# Patient Record
Sex: MALE | Race: WHITE | NOT HISPANIC OR LATINO | Employment: FULL TIME | URBAN - METROPOLITAN AREA
[De-identification: names, ages, dates, MRNs, and addresses within clinical notes are randomized per-mention and may not be internally consistent; named-entity substitution may affect disease eponyms.]

---

## 2017-05-03 ENCOUNTER — OFFICE VISIT (OUTPATIENT)
Dept: INTERNAL MEDICINE | Facility: CLINIC | Age: 27
End: 2017-05-03
Attending: INTERNAL MEDICINE
Payer: COMMERCIAL

## 2017-05-03 ENCOUNTER — LAB VISIT (OUTPATIENT)
Dept: LAB | Facility: OTHER | Age: 27
End: 2017-05-03
Attending: INTERNAL MEDICINE
Payer: COMMERCIAL

## 2017-05-03 VITALS
HEIGHT: 68 IN | HEART RATE: 87 BPM | SYSTOLIC BLOOD PRESSURE: 120 MMHG | WEIGHT: 176.81 LBS | OXYGEN SATURATION: 97 % | DIASTOLIC BLOOD PRESSURE: 88 MMHG | BODY MASS INDEX: 26.8 KG/M2

## 2017-05-03 DIAGNOSIS — R19.8 ALTERNATING CONSTIPATION AND DIARRHEA: ICD-10-CM

## 2017-05-03 DIAGNOSIS — R53.83 FATIGUE, UNSPECIFIED TYPE: ICD-10-CM

## 2017-05-03 DIAGNOSIS — M79.661 RIGHT CALF PAIN: ICD-10-CM

## 2017-05-03 DIAGNOSIS — Z00.00 ANNUAL PHYSICAL EXAM: Primary | ICD-10-CM

## 2017-05-03 DIAGNOSIS — N45.1 EPIDIDYMITIS: ICD-10-CM

## 2017-05-03 DIAGNOSIS — R06.83 SNORES: ICD-10-CM

## 2017-05-03 DIAGNOSIS — Z00.00 ANNUAL PHYSICAL EXAM: ICD-10-CM

## 2017-05-03 LAB
ALBUMIN SERPL BCP-MCNC: 4.3 G/DL
ALP SERPL-CCNC: 73 U/L
ALT SERPL W/O P-5'-P-CCNC: 60 U/L
ANION GAP SERPL CALC-SCNC: 9 MMOL/L
AST SERPL-CCNC: 22 U/L
BASOPHILS # BLD AUTO: 0.04 K/UL
BASOPHILS NFR BLD: 0.6 %
BILIRUB SERPL-MCNC: 1.1 MG/DL
BILIRUB UR QL STRIP: NEGATIVE
BUN SERPL-MCNC: 14 MG/DL
CALCIUM SERPL-MCNC: 9.4 MG/DL
CHLORIDE SERPL-SCNC: 104 MMOL/L
CHOLEST/HDLC SERPL: 4.3 {RATIO}
CLARITY UR: CLEAR
CO2 SERPL-SCNC: 27 MMOL/L
COLOR UR: YELLOW
CREAT SERPL-MCNC: 1 MG/DL
DIFFERENTIAL METHOD: ABNORMAL
EOSINOPHIL # BLD AUTO: 0.2 K/UL
EOSINOPHIL NFR BLD: 2.6 %
ERYTHROCYTE [DISTWIDTH] IN BLOOD BY AUTOMATED COUNT: 12.2 %
EST. GFR  (AFRICAN AMERICAN): >60 ML/MIN/1.73 M^2
EST. GFR  (NON AFRICAN AMERICAN): >60 ML/MIN/1.73 M^2
GLUCOSE SERPL-MCNC: 90 MG/DL
GLUCOSE UR QL STRIP: NEGATIVE
HCT VFR BLD AUTO: 47.5 %
HDL/CHOLESTEROL RATIO: 23.2 %
HDLC SERPL-MCNC: 190 MG/DL
HDLC SERPL-MCNC: 44 MG/DL
HGB BLD-MCNC: 16.9 G/DL
HGB UR QL STRIP: NEGATIVE
IGA SERPL-MCNC: 316 MG/DL
KETONES UR QL STRIP: NEGATIVE
LDLC SERPL CALC-MCNC: 96.2 MG/DL
LEUKOCYTE ESTERASE UR QL STRIP: NEGATIVE
LYMPHOCYTES # BLD AUTO: 2.2 K/UL
LYMPHOCYTES NFR BLD: 31.4 %
MCH RBC QN AUTO: 31.8 PG
MCHC RBC AUTO-ENTMCNC: 35.6 %
MCV RBC AUTO: 90 FL
MONOCYTES # BLD AUTO: 0.6 K/UL
MONOCYTES NFR BLD: 9.1 %
NEUTROPHILS # BLD AUTO: 3.9 K/UL
NEUTROPHILS NFR BLD: 55.4 %
NITRITE UR QL STRIP: NEGATIVE
NONHDLC SERPL-MCNC: 146 MG/DL
PH UR STRIP: 6 [PH] (ref 5–8)
PLATELET # BLD AUTO: 246 K/UL
PMV BLD AUTO: 10.2 FL
POTASSIUM SERPL-SCNC: 4 MMOL/L
PROT SERPL-MCNC: 7.6 G/DL
PROT UR QL STRIP: NEGATIVE
RBC # BLD AUTO: 5.31 M/UL
SODIUM SERPL-SCNC: 140 MMOL/L
SP GR UR STRIP: >=1.03 (ref 1–1.03)
TRIGL SERPL-MCNC: 249 MG/DL
URN SPEC COLLECT METH UR: ABNORMAL
UROBILINOGEN UR STRIP-ACNC: NEGATIVE EU/DL
WBC # BLD AUTO: 6.95 K/UL

## 2017-05-03 PROCEDURE — 85025 COMPLETE CBC W/AUTO DIFF WBC: CPT

## 2017-05-03 PROCEDURE — 1160F RVW MEDS BY RX/DR IN RCRD: CPT | Mod: S$GLB,,, | Performed by: INTERNAL MEDICINE

## 2017-05-03 PROCEDURE — 82784 ASSAY IGA/IGD/IGG/IGM EACH: CPT

## 2017-05-03 PROCEDURE — 81003 URINALYSIS AUTO W/O SCOPE: CPT

## 2017-05-03 PROCEDURE — 83516 IMMUNOASSAY NONANTIBODY: CPT

## 2017-05-03 PROCEDURE — 36415 COLL VENOUS BLD VENIPUNCTURE: CPT

## 2017-05-03 PROCEDURE — 80061 LIPID PANEL: CPT

## 2017-05-03 PROCEDURE — 99385 PREV VISIT NEW AGE 18-39: CPT | Mod: S$GLB,,, | Performed by: INTERNAL MEDICINE

## 2017-05-03 PROCEDURE — 80053 COMPREHEN METABOLIC PANEL: CPT

## 2017-05-03 PROCEDURE — 99999 PR PBB SHADOW E&M-NEW PATIENT-LVL IV: CPT | Mod: PBBFAC,,, | Performed by: INTERNAL MEDICINE

## 2017-05-03 PROCEDURE — 87591 N.GONORRHOEAE DNA AMP PROB: CPT

## 2017-05-03 PROCEDURE — 83036 HEMOGLOBIN GLYCOSYLATED A1C: CPT

## 2017-05-03 RX ORDER — DOXYCYCLINE HYCLATE 50 MG/1
50 CAPSULE ORAL
COMMUNITY
End: 2017-09-27

## 2017-05-03 NOTE — PROGRESS NOTES
Subjective:       Patient ID: Desean Santos is a 26 y.o. male.    Chief Complaint: Establish Care    HPI Comments: Here to establish care    Diagnosed with epididymitis and Oct and treated with amoxicillin with resolution of symptoms. Went to urgent care 4/26/17 for right sided testicular pain and urinary frequency. Started on doxycycline and has 3 days left. 2 days ago he noted return of right sided testicular pain along with pain with ejaculation. He is not taking any for symptoms aside from Abx. He denies dysuria, penile discharge, urinary frequency or urgency, hematuria.      For the past 3 years he has suffered from general abdominal discomfort, alternating diarrhea and constipation, mild cramping and bloating, and heartburn symptoms once a week. He denies any obvious triggers, BRBPR, melena, n/v.      Tingling of right hand of pinky and ring finger. Improves with wearing CTS brace but he does not wear it consistently.     He has been told he snores excessively. Fatigued all the time. Denies witnessed apneic episodes. Bad sleep schedule. Naps frequently during the day. He denies frequent nasal congestion or allergies.       Review of Systems   Constitutional: Positive for fatigue. Negative for appetite change, chills, fever and unexpected weight change.   HENT: Negative for hearing loss, sore throat and trouble swallowing.    Eyes: Negative for visual disturbance.   Respiratory: Negative for cough, chest tightness and shortness of breath.    Cardiovascular: Negative for chest pain and leg swelling.   Gastrointestinal: Negative for abdominal pain, blood in stool, constipation, diarrhea, nausea and vomiting.   Endocrine: Negative for polydipsia and polyuria.   Genitourinary: Negative for decreased urine volume, difficulty urinating, dysuria, frequency and urgency.   Musculoskeletal: Positive for arthralgias. Negative for gait problem.   Skin: Negative for rash.   Neurological: Positive for numbness. Negative for  "dizziness.   Psychiatric/Behavioral: The patient is not nervous/anxious.        Past Medical History:   Diagnosis Date    Allergy     Asthma      History reviewed. No pertinent surgical history.  History reviewed. No pertinent family history.  Social History     Social History    Marital status: Single     Spouse name: N/A    Number of children: N/A    Years of education: N/A     Occupational History    Not on file.     Social History Main Topics    Smoking status: Never Smoker    Smokeless tobacco: Not on file    Alcohol use Yes    Drug use: No    Sexual activity: Yes     Other Topics Concern    Not on file     Social History Narrative    No narrative on file         Objective:      Vitals:    05/03/17 1432   BP: 120/88   Pulse: 87   SpO2: 97%   Weight: 80.2 kg (176 lb 12.9 oz)   Height: 5' 8" (1.727 m)      Physical Exam   Constitutional: He is oriented to person, place, and time. He appears well-developed and well-nourished. No distress.   HENT:   Head: Normocephalic and atraumatic.   Mouth/Throat: Oropharynx is clear and moist. No oropharyngeal exudate.   Eyes: Conjunctivae and EOM are normal. Pupils are equal, round, and reactive to light. No scleral icterus.   Neck: No thyromegaly present.   Cardiovascular: Normal rate, regular rhythm and normal heart sounds.    No murmur heard.  Pulmonary/Chest: Effort normal and breath sounds normal. He has no wheezes. He has no rales.   Abdominal: Soft. He exhibits no distension. There is no tenderness.   Musculoskeletal: He exhibits no edema or tenderness.   Lymphadenopathy:     He has no cervical adenopathy.   Neurological: He is alert and oriented to person, place, and time.   Skin: Skin is warm and dry.   Psychiatric: He has a normal mood and affect. His behavior is normal.       Assessment:       1. Annual physical exam    2. Alternating constipation and diarrhea    3. Epididymitis    4. Snores    5. Fatigue, unspecified type    6. Right calf pain      "   Plan:       Desean was seen today for establish care.    Diagnoses and all orders for this visit:    Annual physical exam  -     Hemoglobin A1c; Future  -     CBC auto differential; Future  -     Comprehensive metabolic panel; Future  -     Lipid panel; Future    Alternating constipation and diarrhea  -trial of dairy free, probiotics, FODMAP diet  -     Tissue transglutaminase, IgA; Future  -     IgA; Future    Epididymitis  -finish doxycycline, NSAIDS, scrotal support. Contact office if pain worsenes or persist despite conservative measures. Will switch to Levaquin at that time.  -     URINALYSIS  -     C. trachomatis/N. gonorrhoeae by AMP DNA Urine    Snores  -     Ambulatory consult for Sleep Study    Fatigue, unspecified type  -     Ambulatory consult for Sleep Study    Right calf pain  -     Ambulatory Referral to Physical/Occupational Therapy       Health Maintenance and Vaccinations:  Tdap up to date: yes. 2010     Notification of Lab Results: [facility]  Side effects of medication(s) were discussed in detail and patient voiced understanding.  Patient will call back for any issues or complications.

## 2017-05-03 NOTE — MR AVS SNAPSHOT
[facility]  [address]  [place] [address]  Phone: [phone]  Fax: [phone]                  Desean Santos   5/3/2017 2:20 PM   Office Visit    Description:  Male : 1990   Provider:  Travis Andrew MD   Department:  [facility]           Reason for Visit     Establish Care           Diagnoses this Visit        Comments    Annual physical exam    -  Primary     Alternating constipation and diarrhea         Epididymitis         Snores         Fatigue, unspecified type         Right calf pain                To Do List           Future Appointments        Provider Department Dept Phone    5/3/2017 4:15 PM LAB, SAME DAY [facility] [phone]    2017 9:20 AM Manuel Baeza MD [facility] [phone]      Goals (5 Years of Data)     None      [facility] On Call     [facility] On [facility] Nurse Care Line -  Assistance  Unless otherwise directed by your provider, please contact [facility], our nurse care line that is available for  assistance.     Registered nurses in the [facility] On Call Center provide: appointment scheduling, clinical advisement, health education, and other advisory services.  Call: [phone] (toll free)               Medications           Message regarding Medications     Verify the changes and/or additions to your medication regime listed below are the same as discussed with your clinician today.  If any of these changes or additions are incorrect, please notify your healthcare provider.             Verify that the below list of medications is an accurate representation of the medications you are currently taking.  If none reported, the list may be blank. If incorrect, please contact your healthcare provider. Carry this list with you in case of emergency.           Current Medications     doxycycline (VIBRAMYCIN) 50 MG capsule Take 50 mg by mouth every 12 (twelve) hours.          "  Clinical Reference Information           Your Vitals Were     BP Pulse Height Weight SpO2 BMI    120/88 87 5' 8" (1.727 m) 80.2 kg (176 lb 12.9 oz) 97% 26.88 kg/m2      Blood Pressure          Most Recent Value    BP  120/88      Allergies as of 5/3/2017     No Known Allergies      Immunizations Administered on Date of Encounter - 5/3/2017     None      Orders Placed During Today's Visit      Normal Orders This Visit    Ambulatory consult for Sleep Study     Ambulatory Referral to Physical/Occupational Therapy     C. trachomatis/N. gonorrhoeae by AMP DNA Urine     URINALYSIS     Future Labs/Procedures Expected by Expires    CBC auto differential  5/3/2017 7/2/2018    Comprehensive metabolic panel  5/3/2017 7/2/2018    Hemoglobin A1c  5/3/2017 7/2/2018    IgA  5/3/2017 7/2/2018    Lipid panel  5/3/2017 7/2/2018    Tissue transglutaminase, IgA  5/3/2017 7/2/2018      Instructions    FODMAP           Language Assistance Services     ATTENTION: Language assistance services are available, free of charge. Please call [phone].      ATENCIÓN: Si habla español, tiene a sepulveda disposición servicios gratuitos de asistencia lingüística. Llame al [phone].     CHÚ Ý: N?u b?n nói Ti?ng Vi?t, có các d?ch v? h? tr? ngôn ng? mi?n phí dành cho b?n. G?i s? [phone].         [facility] complies with applicable Federal civil rights laws and does not discriminate on the basis of race, color, national origin, age, disability, or sex.        "

## 2017-05-04 LAB
C TRACH DNA SPEC QL NAA+PROBE: NOT DETECTED
ESTIMATED AVG GLUCOSE: 103 MG/DL
HBA1C MFR BLD HPLC: 5.2 %
N GONORRHOEA DNA SPEC QL NAA+PROBE: NOT DETECTED

## 2017-05-05 ENCOUNTER — PATIENT MESSAGE (OUTPATIENT)
Dept: INTERNAL MEDICINE | Facility: CLINIC | Age: 27
End: 2017-05-05

## 2017-05-05 ENCOUNTER — OFFICE VISIT (OUTPATIENT)
Dept: SLEEP MEDICINE | Facility: CLINIC | Age: 27
End: 2017-05-05
Attending: INTERNAL MEDICINE
Payer: COMMERCIAL

## 2017-05-05 VITALS
WEIGHT: 176.38 LBS | SYSTOLIC BLOOD PRESSURE: 151 MMHG | DIASTOLIC BLOOD PRESSURE: 106 MMHG | HEART RATE: 91 BPM | BODY MASS INDEX: 26.73 KG/M2 | HEIGHT: 68 IN

## 2017-05-05 DIAGNOSIS — G47.30 SLEEP APNEA, UNSPECIFIED TYPE: Primary | ICD-10-CM

## 2017-05-05 LAB — TTG IGA SER IA-ACNC: 7 UNITS

## 2017-05-05 PROCEDURE — 99999 PR PBB SHADOW E&M-EST. PATIENT-LVL III: CPT | Mod: PBBFAC,,, | Performed by: PSYCHIATRY & NEUROLOGY

## 2017-05-05 PROCEDURE — 1160F RVW MEDS BY RX/DR IN RCRD: CPT | Mod: S$GLB,,, | Performed by: PSYCHIATRY & NEUROLOGY

## 2017-05-05 PROCEDURE — 99204 OFFICE O/P NEW MOD 45 MIN: CPT | Mod: S$GLB,,, | Performed by: PSYCHIATRY & NEUROLOGY

## 2017-05-05 NOTE — LETTER
May 5, 2017      Travis Andrew MD  [address]  [address]  [facility] [address]           [facility]  [address]  [facility] [address]  Phone: [phone]          Patient: Desean Santos   MR Number: [id number]   YOB: 1990   Date of Visit: 5/5/2017       Dear Dr. Travis Andrew:    Thank you for referring Desean Santos to me for evaluation. Attached you will find relevant portions of my assessment and plan of care.    If you have questions, please do not hesitate to call me. I look forward to following Desean Santos along with you.    Sincerely,    Manuel Baeza MD    Enclosure  CC:  No Recipients    If you would like to receive this communication electronically, please contact [email or web address] or [phone] to request more information on [facility] [employer] access.    For providers and/or their staff who would like to refer a patient to [facility], please contact us through our one-stop-shop provider referral line, [facility], at [phone].    If you feel you have received this communication in error or would no longer like to receive these types of communications, please e-mail [email or web address]

## 2017-05-05 NOTE — PROGRESS NOTES
"This 26 y.o. male patient presents for the evaluation of possible obstructive sleep apnea.    "I have really bad snoring problem"  Snoring is really loud, increased with weight gain.  Not as refreshed from sleep as he used to be.    ESS = 6    Previously in college slept MN-7am  After graduation went to [place], then upon returning did not return to regular schedule  In grad school, he kept evening schedule for work and study, sleeping in until noon or later.    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    Nasal breathing is frequently congested due to bad allergies.  Takes zyrtec or claritin    SLEEP ROUTINE:   Bed partner: none   Time to bed: 4-5 am   Sleep onset latency: 20 mins   Disruptions or awakenings: 0-1   Wakeup time: 1-2 pm   Perceived sleep quality: fair   Daytime naps: 0-1    Past Medical History:   Diagnosis Date    Allergy     Asthma        No past surgical history on file.    No family history on file.    Social History   Substance Use Topics    Smoking status: Never Smoker    Smokeless tobacco: Not on file    Alcohol use Yes       ALLERGIES: Reviewed in EPIC    CURRENT MEDICATIONS: Reviewed in EPIC    REVIEW OF SYSTEMS:  Sleep related symptoms as per HPI;    Positive weight gain 45 lbs in last few years;    Denies sinus problems;    Denies dyspnea;    Denies palpitations;    Positive acid reflux;    Denies polyuria;    Denies headaches;    Sometimes mood disturbance;    Denies anemia;    Otherwise, a balance of systems reviewed is negative.    PHYSICAL EXAM:  BP (!) 151/106 (BP Location: Left arm, Patient Position: Sitting, BP Method: Automatic)  Pulse 91  Ht 5' 8" (1.727 m)  Wt 80 kg (176 lb 5.9 oz)  BMI 26.82 kg/m2  GENERAL: Well groomed; Normally developed; Overweight  HEENT: Conjunctivae are non-erythematous; Pupils equal, round, and reactive to light;    Nose is symmetrical; Nasal mucosa is pink and moist; Septum is midline;    Turbinates are normal; Nasal airflow is normal;    Posterior " pharynx is pink, laterally narrowed and crowded; Posterior palate is low; Modified Mallampati: IV   Uvula is normal; Tongue is thick broad; Tonsils +1;    Dentition is fair; No TMJ tenderness;    Jaw opening and protrusion without click and without discomfort.  NECK: Supple. No thyromegaly. No palpable nodes. Neck circumference in inches is 15  SKIN: On face and neck: No abrasions, no rashes, no lesions.     No subcutaneous nodules are palpable.  RESPIRATORY: Chest is clear to auscultation.     Normal chest expansion and non-labored breathing at rest.  CARDIOVASCULAR: Normal S1, S2.  No murmurs, gallops or rubs.   No carotid bruits bilaterally.  EXTREMITIES: No clubbing. No cyanosis. Edema is absent.   NEURO: Oriented to time, place and person.    Normal attention span and concentration.  Station normal. Gait normal.  PSYCH: Affect is full. Mood is normal.      ASSESSMENT:    1. Sleep Apnea, unspecified. The patient symptomatically has snoring and excessive daytime fatigue with exam findings of a crowded oral airway with medical co-morbidities of obesity and elevated blood pressure (not diagnosis of HTN). This warrants further investigation for possible obstructive sleep apnea.         PLAN:    Diagnostic: Home sleep study. The nature of this procedure and its indication was discussed with the patient.    Education: During our discussion today, we talked about the etiology of obstructive sleep apnea as well as the potential ramifications of untreated sleep apnea, which could include daytime sleepiness, hypertension, heart disease and/or stroke.  We discussed potential treatment options, which could include weight loss, body positioning, use of an oral appliance, continuous positive airway pressure (CPAP), EPAP, or referral for surgical consideration.    Trial melatonin up to 1 mg, 2-3 hours before planned bedtime to help set circadian rhythm.    Precautions: The patient was advised to abstain from driving should  they feel sleepy or drowsy.    Follow up: I will see the patient back after the sleep study has been completed. At this time, we can review the data and discuss potential treatment options. MD/NP

## 2017-05-05 NOTE — TELEPHONE ENCOUNTER
Pt sent insurance card picture via [facility], pictures were printed and scanned to pt chart, Notified pt via [facility]

## 2017-05-05 NOTE — MR AVS SNAPSHOT
"    [facility]  [address]  [facility] [address]  Phone: [phone]                  Desean Santos   2017 9:20 AM   Office Visit    Description:  Male : 1990   Provider:  Manuel Baeza MD   Department:  [facility]           Reason for Visit     Snoring     Fatigue           Diagnoses this Visit        Comments    Sleep apnea, unspecified type    -  Primary            To Do List           Goals (5 Years of Data)     None      [facility] On Call     [facility] On Call Nurse Care Line -  Assistance  Unless otherwise directed by your provider, please contact [facility], our nurse care line that is available for  assistance.     Registered nurses in the [facility] On Call Center provide: appointment scheduling, clinical advisement, health education, and other advisory services.  Call: [phone] (toll free)               Medications           Message regarding Medications     Verify the changes and/or additions to your medication regime listed below are the same as discussed with your clinician today.  If any of these changes or additions are incorrect, please notify your healthcare provider.             Verify that the below list of medications is an accurate representation of the medications you are currently taking.  If none reported, the list may be blank. If incorrect, please contact your healthcare provider. Carry this list with you in case of emergency.           Current Medications     doxycycline (VIBRAMYCIN) 50 MG capsule Take 50 mg by mouth every 12 (twelve) hours.           Clinical Reference Information           Your Vitals Were     BP Pulse Height Weight BMI    151/106 (BP Location: Left arm, Patient Position: Sitting, BP Method: Automatic) 91 5' 8" (1.727 m) 80 kg (176 lb 5.9 oz) 26.82 kg/m2      Blood Pressure          Most Recent Value    BP  (!)  151/106      Allergies as of 2017     No Known Allergies      Immunizations " Administered on Date of Encounter - 5/5/2017     None      Orders Placed During Today's Visit     Future Labs/Procedures Expected by Expires    Home Sleep Studies  As directed 5/5/2018      Instructions    Patient is candidate for home sleep testing.  Set up testing with [facility].  Scheduled by [facility]    1. Norah/Davy will contact you to schedule your sleep study [phone] (ext 1)  2. Sleep Lab is located in the [facility], take [place] elevator to 7th floor; You will see sign for [facility]         Language Assistance Services     ATTENTION: Language assistance services are available, free of charge. Please call [phone].      ATENCIÓN: Si habla elmer, tiene a sepulveda disposición servicios gratuitos de asistencia lingüística. Llame al [phone].     CHÚ Ý: N?u b?n nói Ti?ng Vi?t, có các d?ch v? h? tr? ngôn ng? mi?n phí dành cho b?n. G?i s? [phone].         [facility] complies with applicable Federal civil rights laws and does not discriminate on the basis of race, color, national origin, age, disability, or sex.

## 2017-05-05 NOTE — PATIENT INSTRUCTIONS
Patient is candidate for home sleep testing.  Set up testing with [facility].  Scheduled by [facility]    1. Norah/Davy will contact you to schedule your sleep study [phone] (ext 1)  2. Sleep Lab is located in the [facility], take [place] elevator to 7th floor; You will see sign for [facility]

## 2017-05-09 ENCOUNTER — TELEPHONE (OUTPATIENT)
Dept: SLEEP MEDICINE | Facility: OTHER | Age: 27
End: 2017-05-09

## 2017-05-15 ENCOUNTER — TELEPHONE (OUTPATIENT)
Dept: SLEEP MEDICINE | Facility: OTHER | Age: 27
End: 2017-05-15

## 2017-06-23 ENCOUNTER — TELEPHONE (OUTPATIENT)
Dept: SLEEP MEDICINE | Facility: OTHER | Age: 27
End: 2017-06-23

## 2017-06-26 ENCOUNTER — HOSPITAL ENCOUNTER (OUTPATIENT)
Dept: SLEEP MEDICINE | Facility: OTHER | Age: 27
Discharge: HOME | End: 2017-06-26
Attending: PSYCHIATRY & NEUROLOGY
Payer: COMMERCIAL

## 2017-06-26 DIAGNOSIS — G47.30 SLEEP APNEA, UNSPECIFIED TYPE: ICD-10-CM

## 2017-06-26 DIAGNOSIS — G47.33 OSA (OBSTRUCTIVE SLEEP APNEA): ICD-10-CM

## 2017-06-26 PROCEDURE — 95800 SLP STDY UNATTENDED: CPT | Mod: 26,,, | Performed by: PSYCHIATRY & NEUROLOGY

## 2017-06-26 PROCEDURE — 95800 SLP STDY UNATTENDED: CPT

## 2017-07-03 ENCOUNTER — TELEPHONE (OUTPATIENT)
Dept: SLEEP MEDICINE | Facility: OTHER | Age: 27
End: 2017-07-03

## 2017-09-23 ENCOUNTER — HOSPITAL ENCOUNTER (EMERGENCY)
Facility: OTHER | Age: 27
Discharge: HOME | End: 2017-09-23
Attending: EMERGENCY MEDICINE
Payer: COMMERCIAL

## 2017-09-23 VITALS
RESPIRATION RATE: 17 BRPM | HEART RATE: 88 BPM | BODY MASS INDEX: 25.76 KG/M2 | OXYGEN SATURATION: 99 % | WEIGHT: 170 LBS | HEIGHT: 68 IN | TEMPERATURE: 98 F | SYSTOLIC BLOOD PRESSURE: 144 MMHG | DIASTOLIC BLOOD PRESSURE: 80 MMHG

## 2017-09-23 DIAGNOSIS — N20.0 KIDNEY STONE: Primary | ICD-10-CM

## 2017-09-23 DIAGNOSIS — N13.30 HYDRONEPHROSIS, UNSPECIFIED HYDRONEPHROSIS TYPE: ICD-10-CM

## 2017-09-23 DIAGNOSIS — R10.9 FLANK PAIN: ICD-10-CM

## 2017-09-23 LAB
ANION GAP SERPL CALC-SCNC: 12 MMOL/L
BASOPHILS # BLD AUTO: 0.03 K/UL
BASOPHILS NFR BLD: 0.3 %
BUN SERPL-MCNC: 11 MG/DL
CALCIUM SERPL-MCNC: 9.5 MG/DL
CHLORIDE SERPL-SCNC: 103 MMOL/L
CO2 SERPL-SCNC: 24 MMOL/L
CREAT SERPL-MCNC: 1.2 MG/DL
DIFFERENTIAL METHOD: ABNORMAL
EOSINOPHIL # BLD AUTO: 0.1 K/UL
EOSINOPHIL NFR BLD: 1.2 %
ERYTHROCYTE [DISTWIDTH] IN BLOOD BY AUTOMATED COUNT: 12.2 %
EST. GFR  (AFRICAN AMERICAN): >60 ML/MIN/1.73 M^2
EST. GFR  (NON AFRICAN AMERICAN): >60 ML/MIN/1.73 M^2
GLUCOSE SERPL-MCNC: 142 MG/DL
HCT VFR BLD AUTO: 47.4 %
HGB BLD-MCNC: 17 G/DL
LYMPHOCYTES # BLD AUTO: 3.1 K/UL
LYMPHOCYTES NFR BLD: 26.1 %
MCH RBC QN AUTO: 32.4 PG
MCHC RBC AUTO-ENTMCNC: 35.9 G/DL
MCV RBC AUTO: 90 FL
MONOCYTES # BLD AUTO: 0.8 K/UL
MONOCYTES NFR BLD: 6.4 %
NEUTROPHILS # BLD AUTO: 7.8 K/UL
NEUTROPHILS NFR BLD: 65.6 %
PLATELET # BLD AUTO: 281 K/UL
PMV BLD AUTO: 10.4 FL
POTASSIUM SERPL-SCNC: 3.7 MMOL/L
RBC # BLD AUTO: 5.25 M/UL
SODIUM SERPL-SCNC: 139 MMOL/L
WBC # BLD AUTO: 11.81 K/UL

## 2017-09-23 PROCEDURE — 96365 THER/PROPH/DIAG IV INF INIT: CPT

## 2017-09-23 PROCEDURE — 96361 HYDRATE IV INFUSION ADD-ON: CPT

## 2017-09-23 PROCEDURE — 25000003 PHARM REV CODE 250: Performed by: EMERGENCY MEDICINE

## 2017-09-23 PROCEDURE — 63600175 PHARM REV CODE 636 W HCPCS: Performed by: EMERGENCY MEDICINE

## 2017-09-23 PROCEDURE — 85025 COMPLETE CBC W/AUTO DIFF WBC: CPT

## 2017-09-23 PROCEDURE — 96375 TX/PRO/DX INJ NEW DRUG ADDON: CPT

## 2017-09-23 PROCEDURE — 99284 EMERGENCY DEPT VISIT MOD MDM: CPT | Mod: 25

## 2017-09-23 PROCEDURE — 96366 THER/PROPH/DIAG IV INF ADDON: CPT

## 2017-09-23 PROCEDURE — 80048 BASIC METABOLIC PNL TOTAL CA: CPT

## 2017-09-23 PROCEDURE — 96376 TX/PRO/DX INJ SAME DRUG ADON: CPT

## 2017-09-23 RX ORDER — KETOROLAC TROMETHAMINE 30 MG/ML
15 INJECTION, SOLUTION INTRAMUSCULAR; INTRAVENOUS
Status: COMPLETED | OUTPATIENT
Start: 2017-09-23 | End: 2017-09-23

## 2017-09-23 RX ORDER — OXYCODONE AND ACETAMINOPHEN 5; 325 MG/1; MG/1
1 TABLET ORAL EVERY 4 HOURS PRN
Qty: 8 TABLET | Refills: 0 | Status: SHIPPED | OUTPATIENT
Start: 2017-09-23 | End: 2017-09-23 | Stop reason: SINTOL

## 2017-09-23 RX ORDER — IBUPROFEN 800 MG/1
800 TABLET ORAL 3 TIMES DAILY
Qty: 30 TABLET | Refills: 0 | Status: SHIPPED | OUTPATIENT
Start: 2017-09-23

## 2017-09-23 RX ORDER — LIDOCAINE HCL/PF 100 MG/5ML
100 SYRINGE (ML) INTRAVENOUS
Status: DISCONTINUED | OUTPATIENT
Start: 2017-09-23 | End: 2017-09-23 | Stop reason: HOSPADM

## 2017-09-23 RX ORDER — ACETAMINOPHEN 325 MG/1
650 TABLET ORAL
Status: COMPLETED | OUTPATIENT
Start: 2017-09-23 | End: 2017-09-23

## 2017-09-23 RX ORDER — MORPHINE SULFATE 2 MG/ML
2 INJECTION, SOLUTION INTRAMUSCULAR; INTRAVENOUS
Status: DISCONTINUED | OUTPATIENT
Start: 2017-09-23 | End: 2017-09-23 | Stop reason: HOSPADM

## 2017-09-23 RX ADMIN — ACETAMINOPHEN 650 MG: 325 TABLET ORAL at 11:09

## 2017-09-23 RX ADMIN — KETOROLAC TROMETHAMINE 15 MG: 30 INJECTION, SOLUTION INTRAMUSCULAR at 02:09

## 2017-09-23 RX ADMIN — SODIUM CHLORIDE 1000 ML: 0.9 INJECTION, SOLUTION INTRAVENOUS at 11:09

## 2017-09-23 RX ADMIN — LIDOCAINE HYDROCHLORIDE: 20 INJECTION, SOLUTION INTRAVENOUS at 12:09

## 2017-09-23 RX ADMIN — KETOROLAC TROMETHAMINE 15 MG: 30 INJECTION, SOLUTION INTRAMUSCULAR at 11:09

## 2017-09-23 NOTE — ED PROVIDER NOTES
"Encounter Date: 9/23/2017    SCRIBE #1 NOTE: I, Radha Foote, am scribing for, and in the presence of,  Dr. Bay. I have scribed the entire note.       History     Chief Complaint   Patient presents with    Nephrolithiasis     pt reports "kidney stones" sympotms started this morning to left flank; denies any dysuria/hematuria; N/V but denies any diarrhea     Time seen by provider: 10:59 AM    This is a 26 y.o. male who presents with complaint of L sided flank pain x 2.5 hours. Pt notes sudden onset of pain s/p urination, which has been constant since. He has also had one episode of emesis. Pt notes similarity between nephrolithiasis and his current symptoms. Pt has no history of nephrolithiasis. He has no history of abdominal surgeries. Pt denies any recent trauma or injury to the back. He also denies any dysuria, hematuria, urinary frequency, or urinary urgency. He also denies any fever and chills. Pt has NKDA.    The history is provided by the patient.     Review of patient's allergies indicates:  No Known Allergies  Past Medical History:   Diagnosis Date    Allergy     Asthma      History reviewed. No pertinent surgical history.  History reviewed. No pertinent family history.  Social History   Substance Use Topics    Smoking status: Never Smoker    Smokeless tobacco: Not on file    Alcohol use Yes     Review of Systems   Constitutional: Negative for chills and fever.   HENT: Negative for rhinorrhea and sore throat.    Respiratory: Negative for cough, shortness of breath and wheezing.    Cardiovascular: Negative for chest pain and palpitations.   Gastrointestinal: Positive for nausea and vomiting. Negative for constipation and diarrhea.   Genitourinary: Positive for flank pain (L). Negative for dysuria, frequency, hematuria and urgency.   Musculoskeletal: Negative for myalgias and neck pain.   Skin: Negative for pallor and rash.   Neurological: Negative for dizziness, numbness and headaches. "   Hematological: Does not bruise/bleed easily.   Psychiatric/Behavioral: Negative for behavioral problems and confusion.       Physical Exam     Initial Vitals [09/23/17 1030]   BP Pulse Resp Temp SpO2   (!) 142/72 67 20 97.5 °F (36.4 °C) 99 %      MAP       95.33         Physical Exam    Nursing note and vitals reviewed.  Constitutional: He appears well-developed and well-nourished. He is diaphoretic. He appears distressed.   Appears uncomfortable.   HENT:   Head: Normocephalic and atraumatic.   Eyes: Conjunctivae and EOM are normal. Pupils are equal, round, and reactive to light.   Neck: Normal range of motion. Neck supple.   Cardiovascular: Normal rate, regular rhythm and normal heart sounds.   No tachycardia.   Pulmonary/Chest: Effort normal and breath sounds normal.   Abdominal: Soft. Normal appearance and bowel sounds are normal. There is no tenderness. There is CVA tenderness (L).   Musculoskeletal: Normal range of motion.   Neurological: He is alert and oriented to person, place, and time. He has normal strength. No cranial nerve deficit or sensory deficit.   Skin: Skin is warm. There is pallor.   Psychiatric: He has a normal mood and affect. His behavior is normal. Thought content normal.         ED Course   Procedures  Labs Reviewed   CBC W/ AUTO DIFFERENTIAL - Abnormal; Notable for the following:        Result Value    MCH 32.4 (*)     Gran # 7.8 (*)     All other components within normal limits   BASIC METABOLIC PANEL - Abnormal; Notable for the following:     Glucose 142 (*)     All other components within normal limits   URINALYSIS              Imaging Results          CT Renal Stone Study ABD Pelvis WO (Final result)  Result time 09/23/17 12:14:23    Final result by Ish Nassar Jr., MD (09/23/17 12:14:23)                 Impression:     6 mm proximal left ureteral calculus with associated dilatation of the left intrarenal collecting system and perinephric stranding.      Electronically signed  by: MARIANA ALAMO MD  Date:     09/23/17  Time:    12:14              Narrative:    CT of the abdomen and pelvis was performed without the use of oral intravenous contrast.  Axial sagittal and coronal reformatted images submitted.    No prior films for comparison.    Findings: The lung bases are clear.  No pleural fluid.    In the abdomen the liver, gallbladder, pancreas and spleen unremarkable.  No adrenal masses.  The most significant finding is presence of mild left hydronephrosis with kin-nephric stranding.  There is an obstructing 6 mm calculus in the proximal left ureter.  More distal left ureter and right collecting system are unremarkable.  No convincing renal masses on this noncontrast exam.    The aorta tapers normally and there is no significant periaortic adenopathy.    In the pelvis no pelvic adenopathy.  Prostate is unremarkable.  Visualized loops of bowel are not dilated.  Bone windows demonstrate no lytic or blastic lesions.                              Medical Decision Making:   Initial Assessment:   Patient of 26-year-old gentleman presenting with complaints of left-sided flank pain, perceived diagnosis of kidney stone per [employer].  Patient denies dysuria, had abrupt onset of flank pain at 8:30 in the morning, with nausea.  Patient appears uncomfortable, diaphoretic with CVA tenderness.  Differential includes UTI, kidney stone, pyelonephritis, less likely pancreatitis, constipation.          Clinical Tests:   Lab Tests: Ordered and Reviewed  Radiological Study: Ordered and Reviewed  ED Management:  Ct imaging notable for 6 mm stone in  Proximal ureter with mild hydro and perinephric stranding.   1:15 PM  D/w Dr Oseguera finding of obstructing stone, and recommends expectant management as outpt w plan to follow in clinic or return if unimproved/fever development. Pt received iv lidocaine infusion for analgesia w moderate relief. Repeat dose Toradol improved pain to manageable level with plan dc home w  urine strainer, hydration, and fu Urology.             Scribe Attestation:   Scribe #1: I performed the above scribed service and the documentation accurately describes the services I performed. I attest to the accuracy of the note.    Attending Attestation:           Physician Attestation for Scribe:  Physician Attestation Statement for Scribe #1: I, Dr. Bay, reviewed documentation, as scribed by Radha Foote in my presence, and it is both accurate and complete.                 ED Course      Clinical Impression:     1. Kidney stone    2. Flank pain    3. Hydronephrosis, unspecified hydronephrosis type          Disposition:   Disposition: Discharged  Condition: Fair                        Laney Bay MD  09/23/17 9913

## 2017-09-23 NOTE — ED NOTES
Pt used call light to inform RN of shaking. RN entered room, pt pale and chattering teeth. Oral temp 97.7.

## 2017-09-23 NOTE — ED TRIAGE NOTES
Pt reports sudden onset of L side flank pain at 0830. Last urinated without issue at 0800. Denies the feeling of having to urinate. Pt reports constant pain.

## 2017-09-24 NOTE — PROVIDER PROGRESS NOTES - EMERGENCY DEPT.
Encounter Date: 9/23/2017    ED Physician Progress Notes        Physician Note:   10:07 PM  Called pt to inform that I forgot to prescribe Flomax. Pt reports feeling much better. Flomax called in to Soraya Hernandez.

## 2017-09-27 ENCOUNTER — OFFICE VISIT (OUTPATIENT)
Dept: UROLOGY | Facility: CLINIC | Age: 27
End: 2017-09-27
Attending: UROLOGY
Payer: COMMERCIAL

## 2017-09-27 VITALS
BODY MASS INDEX: 26.83 KG/M2 | WEIGHT: 177 LBS | HEART RATE: 72 BPM | DIASTOLIC BLOOD PRESSURE: 83 MMHG | HEIGHT: 68 IN | SYSTOLIC BLOOD PRESSURE: 125 MMHG

## 2017-09-27 DIAGNOSIS — N23 RENAL COLIC: ICD-10-CM

## 2017-09-27 DIAGNOSIS — N20.0 NEPHROLITHIASIS: Primary | ICD-10-CM

## 2017-09-27 LAB
BILIRUB SERPL-MCNC: ABNORMAL MG/DL
BLOOD URINE, POC: ABNORMAL
COLOR, POC UA: ABNORMAL
GLUCOSE UR QL STRIP: NORMAL
KETONES UR QL STRIP: ABNORMAL
LEUKOCYTE ESTERASE URINE, POC: ABNORMAL
NITRITE, POC UA: ABNORMAL
PH, POC UA: 5
PROTEIN, POC: ABNORMAL
SPECIFIC GRAVITY, POC UA: 1
UROBILINOGEN, POC UA: NORMAL

## 2017-09-27 PROCEDURE — 81002 URINALYSIS NONAUTO W/O SCOPE: CPT | Mod: S$GLB,,, | Performed by: UROLOGY

## 2017-09-27 PROCEDURE — 99244 OFF/OP CNSLTJ NEW/EST MOD 40: CPT | Mod: 25,S$GLB,, | Performed by: UROLOGY

## 2017-09-27 NOTE — PROGRESS NOTES
"Subjective:       Desean Santos is a 26 y.o. male who is a new patient who was referred by Dr Bay for evaluation of kidney stone.      He was seen in ER on 9/23/17 with L flank pain and n/v. CT scan showed 6mm proximal L ureteral calculus with mild proximal hydronephrosis. Denies fevers. He has not had previous stones. He is taking pain medications and Flomax. He has not noted passing a stone. Pain is present but moderately controled with ibuprofen (declines narcotics). Stone is visible on .       The following portions of the patient's history were reviewed and updated as appropriate: allergies, current medications, past family history, past medical history, past social history, past surgical history and problem list.    Review of Systems  Constitutional: no fever or chills  ENT: no nasal congestion or sore throat  Respiratory: no cough or shortness of breath  Cardiovascular: no chest pain or palpitations  Gastrointestinal: no nausea or vomiting, tolerating diet  Genitourinary: as per HPI  Hematologic/Lymphatic: no easy bruising or lymphadenopathy  Musculoskeletal: no arthralgias or myalgias  Skin: no rashes or lesions  Neurological: no seizures or tremors  Behavioral/Psych: no auditory or visual hallucinations       Objective:    Vitals: /83 (BP Location: Left arm, Patient Position: Sitting, BP Method: Large (Automatic))   Pulse 72   Ht 5' 8" (1.727 m)   Wt 80.3 kg (177 lb 0.5 oz)   BMI 26.92 kg/m²     Physical Exam   General: well developed, well nourished in no acute distress  Head: normocephalic, atraumatic  Neck: supple, trachea midline, no obvious enlargement of thyroid  HEENT: EOMI, mucus membranes moist, sclera anicteric, no hearing impairment  Lungs: symmetric expansion, non-labored breathing  Cardiovascular: regular rate and rhythm, normal pulses  Abdomen: soft, non tender, non distended, no palpable masses, no hepatosplenomegaly, no hernias, bladder nonpalpable. No CVA " tenderness.  Musculoskeletal: no peripheral edema, normal ROM in bilateral upper and lower extremities  Lymphatics: no cervical or inguinal lymphadenopathy  Skin: no rashes or lesions  Neuro: alert and oriented x 3, no gross deficits  Psych: normal judgment and insight, normal mood/affect and non-anxious  Genitourinary:   patient declined exam   CATHY: patient declined exam      Lab Review   Urine analysis today in clinic shows +blood     Lab Results   Component Value Date    WBC 11.81 09/23/2017    HGB 17.0 09/23/2017    HCT 47.4 09/23/2017    MCV 90 09/23/2017     09/23/2017     Lab Results   Component Value Date    CREATININE 1.2 09/23/2017    BUN 11 09/23/2017     No results found for: PSA  No results found for: PSADIAG    Imaging  CT reviewed  Reports and images were personally reviewed by me and discussed with the patient today         Assessment/Plan:      1. Nephrolithiasis    - 6mm stone in L prox ureter   - Remains with pain   - Offered ESWL tomorrow with [facility] or [facility], declined   - Offered scheduling URS next week with me if unable to pass   - He is not inclined to surgery (difficulty recovering from general anesthesia)   - Trial of MET. Encouraged fluids, Flomax   - Discussed red flags to go to ER: fever, inability to tolerate PO, pain not controlled by medications.   - KUB on Monday next week     2. Renal colic    - 2/2 obstructing stone in L prox ureter       Follow up in 1 week

## 2017-10-02 ENCOUNTER — HOSPITAL ENCOUNTER (OUTPATIENT)
Dept: RADIOLOGY | Facility: OTHER | Age: 27
Discharge: HOME | End: 2017-10-02
Attending: UROLOGY
Payer: COMMERCIAL

## 2017-10-02 DIAGNOSIS — N20.0 NEPHROLITHIASIS: ICD-10-CM

## 2017-10-02 PROCEDURE — 74000 XR KUB: CPT | Mod: TC

## 2017-10-02 PROCEDURE — 74000 XR KUB: CPT | Mod: 26,,, | Performed by: RADIOLOGY

## 2017-10-03 ENCOUNTER — TELEPHONE (OUTPATIENT)
Dept: UROLOGY | Facility: CLINIC | Age: 27
End: 2017-10-03

## 2017-10-03 NOTE — TELEPHONE ENCOUNTER
Please inform pt that kidney stone remains in ureter on recent x-ray -  it is slightly lower that previous scan.     If he wants treatment of stone tomorrow at [facility] (ureteroscopy), he can see NP today to arrange. Also could do ESWL at [facility] next Tuesday (in one week) to treat stone.

## 2017-10-04 ENCOUNTER — OFFICE VISIT (OUTPATIENT)
Dept: UROLOGY | Facility: CLINIC | Age: 27
End: 2017-10-04
Attending: UROLOGY
Payer: COMMERCIAL

## 2017-10-04 VITALS
HEART RATE: 72 BPM | DIASTOLIC BLOOD PRESSURE: 76 MMHG | SYSTOLIC BLOOD PRESSURE: 114 MMHG | HEIGHT: 68 IN | WEIGHT: 177 LBS | BODY MASS INDEX: 26.83 KG/M2

## 2017-10-04 DIAGNOSIS — N20.0 RENAL STONE: ICD-10-CM

## 2017-10-04 DIAGNOSIS — N20.1 URETERAL STONE: Primary | ICD-10-CM

## 2017-10-04 DIAGNOSIS — N23 RENAL COLIC: ICD-10-CM

## 2017-10-04 PROCEDURE — 99214 OFFICE O/P EST MOD 30 MIN: CPT | Mod: S$GLB,,, | Performed by: UROLOGY

## 2017-10-04 RX ORDER — CIPROFLOXACIN 2 MG/ML
400 INJECTION, SOLUTION INTRAVENOUS
Status: CANCELLED | OUTPATIENT
Start: 2017-10-04

## 2017-10-04 NOTE — PROGRESS NOTES
"Subjective:       Desean Santos is a 26 y.o. male who is a new patient who was referred by Dr Bay for evaluation of kidney stone.      He was seen in ER on 9/23/17 with L flank pain and n/v. CT scan showed 6mm proximal L ureteral calculus with mild proximal hydronephrosis. Denies fevers. He has not had previous stones. He is taking pain medications and Flomax. He has not noted passing a stone. Pain is present but moderately controled with ibuprofen (declines narcotics). Stone is visible on .     KUB done 10/2/17 shows stone in prox/mid ureter, just slightly descended from CT scan. He was offered URS today but declined.       The following portions of the patient's history were reviewed and updated as appropriate: allergies, current medications, past family history, past medical history, past social history, past surgical history and problem list.    Review of Systems  Constitutional: no fever or chills  ENT: no nasal congestion or sore throat  Respiratory: no cough or shortness of breath  Cardiovascular: no chest pain or palpitations  Gastrointestinal: no nausea or vomiting, tolerating diet  Genitourinary: as per HPI  Hematologic/Lymphatic: no easy bruising or lymphadenopathy  Musculoskeletal: no arthralgias or myalgias  Skin: no rashes or lesions  Neurological: no seizures or tremors  Behavioral/Psych: no auditory or visual hallucinations       Objective:    Vitals: /76 (BP Location: Right arm, Patient Position: Sitting, BP Method: Large (Automatic))   Pulse 72   Ht 5' 8" (1.727 m)   Wt 80.3 kg (177 lb)   BMI 26.91 kg/m²     Physical Exam   General: well developed, well nourished in no acute distress  Head: normocephalic, atraumatic  Neck: supple, trachea midline, no obvious enlargement of thyroid  HEENT: EOMI, mucus membranes moist, sclera anicteric, no hearing impairment  Lungs: symmetric expansion, non-labored breathing  Cardiovascular: regular rate and rhythm, normal pulses  Abdomen: " soft, non tender, non distended, no palpable masses, no hepatosplenomegaly, no hernias, bladder nonpalpable. No CVA tenderness.  Musculoskeletal: no peripheral edema, normal ROM in bilateral upper and lower extremities  Lymphatics: no cervical or inguinal lymphadenopathy  Skin: no rashes or lesions  Neuro: alert and oriented x 3, no gross deficits  Psych: normal judgment and insight, normal mood/affect and non-anxious  Genitourinary:   patient declined exam   CATHY: patient declined exam      Lab Review   Urine analysis today in clinic shows +blood     Lab Results   Component Value Date    WBC 11.81 09/23/2017    HGB 17.0 09/23/2017    HCT 47.4 09/23/2017    MCV 90 09/23/2017     09/23/2017     Lab Results   Component Value Date    CREATININE 1.2 09/23/2017    BUN 11 09/23/2017     No results found for: PSA  No results found for: PSADIAG    Imaging  CT reviewed  Reports and images were personally reviewed by me and discussed with the patient today         Assessment/Plan:      1. Nephrolithiasis    - 6mm stone in L prox ureter   - Remains with pain   - Offered ESWL previously, declined   - Offered scheduling URS with me previously if unable to pass, declined   - He is not inclined to surgery (difficulty recovering from general anesthesia)   - Trial of MET. Encouraged fluids, Flomax   - Discussed red flags to go to ER: fever, inability to tolerate PO, pain not controlled by medications.   - KUB - stone remains present   - Agrees to ESWL at [facility] 10/10/17. Risks/benefits/alternatives discussed.      2. Renal colic    - 2/2 obstructing stone in L prox ureter       Follow up in 3 weeks with KUB

## 2017-10-06 ENCOUNTER — HOSPITAL ENCOUNTER (OUTPATIENT)
Dept: PREADMISSION TESTING | Facility: HOSPITAL | Age: 27
Discharge: HOME | End: 2017-10-06
Attending: UROLOGY
Payer: COMMERCIAL

## 2017-10-06 VITALS
OXYGEN SATURATION: 98 % | BODY MASS INDEX: 26.22 KG/M2 | SYSTOLIC BLOOD PRESSURE: 128 MMHG | DIASTOLIC BLOOD PRESSURE: 85 MMHG | HEIGHT: 68 IN | WEIGHT: 173 LBS | TEMPERATURE: 99 F | HEART RATE: 66 BPM | RESPIRATION RATE: 18 BRPM

## 2017-10-06 DIAGNOSIS — Z01.818 PRE-OP TESTING: Primary | ICD-10-CM

## 2017-10-06 LAB
BASOPHILS # BLD AUTO: 0.07 K/UL
BASOPHILS NFR BLD: 0.8 %
DIFFERENTIAL METHOD: NORMAL
EOSINOPHIL # BLD AUTO: 0.2 K/UL
EOSINOPHIL NFR BLD: 2.8 %
ERYTHROCYTE [DISTWIDTH] IN BLOOD BY AUTOMATED COUNT: 11.8 %
HCT VFR BLD AUTO: 45.6 %
HGB BLD-MCNC: 16.1 G/DL
LYMPHOCYTES # BLD AUTO: 2.7 K/UL
LYMPHOCYTES NFR BLD: 31.8 %
MCH RBC QN AUTO: 31 PG
MCHC RBC AUTO-ENTMCNC: 35.3 G/DL
MCV RBC AUTO: 88 FL
MONOCYTES # BLD AUTO: 0.8 K/UL
MONOCYTES NFR BLD: 9.3 %
NEUTROPHILS # BLD AUTO: 4.7 K/UL
NEUTROPHILS NFR BLD: 54.9 %
PLATELET # BLD AUTO: 295 K/UL
PMV BLD AUTO: 9.9 FL
RBC # BLD AUTO: 5.19 M/UL
WBC # BLD AUTO: 8.56 K/UL

## 2017-10-06 PROCEDURE — 85025 COMPLETE CBC W/AUTO DIFF WBC: CPT

## 2017-10-06 NOTE — DISCHARGE INSTRUCTIONS
Your surgery is scheduled for___10/10/2017______________.    Call [phone] between 2 pm and 5 pm ___10/9/2017_________ to find out your arrival time for the day of surgery.    Report to SAME DAY SURGERY UNIT at _______am on the 2nd floor of the hospital.  Use the front entrance of the hospital before 6 am.  If you need wheelchair assistance, call [phone] from your cell phone,  or call 0 from the courtesy phone in the hospital lobby.    Important instructions:   Do not eat or drink after 12 midnight, including water.  It is okay to brush your teeth.  Do not have gum, candy or mints.     Take only these medications with a small swallow of water on the morning of your surgery.__none___________       Please shower the night before and the morning of your surgery.       Use Hibiclens soap to your surgery site if instructed by your pre op nurse.   If your surgery is on your abdomen, be sure to wash your naval.  Be sure to rinse off Hibiclens after it is on your skin for several minutes.  Do not use Hibiclens on your face or genitals.        You may wear deodorant only.      Do not wear powder, body lotion or cologne.     Do not wear any jewelry or have any metal on your body.     Please bring any documents given to you by your doctor.     If you are going home on the same day of surgery, you must have arrangements for a ride home.  You will not be able to drive home if you were given anesthesia or sedation.     Stop taking Aspirin, Ibuprofen, Motrin and Aleve at least 3-5 days before your surgery. You may use Tylenol.     Stop taking fish oil and vitamin E for least 5 days before surgery.     Wear loose fitting clothes allowing for bandages.     Please leave money and valuables home.       You may bring your cell phone.     Call the doctor if fever or illness should occur before your surgery.    Call [phone] to contact us here at [facility] if needed.

## 2017-10-09 ENCOUNTER — ANESTHESIA EVENT (OUTPATIENT)
Dept: SURGERY | Facility: HOSPITAL | Age: 27
End: 2017-10-09
Payer: COMMERCIAL

## 2017-10-10 ENCOUNTER — TELEPHONE (OUTPATIENT)
Dept: UROLOGY | Facility: CLINIC | Age: 27
End: 2017-10-10

## 2017-10-10 ENCOUNTER — ANESTHESIA (OUTPATIENT)
Dept: SURGERY | Facility: HOSPITAL | Age: 27
End: 2017-10-10
Payer: COMMERCIAL

## 2017-10-10 ENCOUNTER — HOSPITAL ENCOUNTER (OUTPATIENT)
Facility: HOSPITAL | Age: 27
Discharge: HOME | End: 2017-10-10
Attending: UROLOGY | Admitting: UROLOGY
Payer: COMMERCIAL

## 2017-10-10 ENCOUNTER — SURGERY (OUTPATIENT)
Age: 27
End: 2017-10-10

## 2017-10-10 VITALS
HEIGHT: 68 IN | SYSTOLIC BLOOD PRESSURE: 122 MMHG | WEIGHT: 172.81 LBS | BODY MASS INDEX: 26.19 KG/M2 | TEMPERATURE: 98 F | OXYGEN SATURATION: 100 % | RESPIRATION RATE: 20 BRPM | HEART RATE: 85 BPM | DIASTOLIC BLOOD PRESSURE: 76 MMHG

## 2017-10-10 DIAGNOSIS — N20.0 RENAL STONE: ICD-10-CM

## 2017-10-10 DIAGNOSIS — N20.1 URETERAL STONE: Primary | ICD-10-CM

## 2017-10-10 PROCEDURE — 37000008 HC ANESTHESIA 1ST 15 MINUTES: Performed by: UROLOGY

## 2017-10-10 PROCEDURE — 50590 FRAGMENTING OF KIDNEY STONE: CPT | Mod: LT,,, | Performed by: UROLOGY

## 2017-10-10 PROCEDURE — D9220A PRA ANESTHESIA: Mod: ANES,,, | Performed by: ANESTHESIOLOGY

## 2017-10-10 PROCEDURE — 71000015 HC POSTOP RECOV 1ST HR: Performed by: UROLOGY

## 2017-10-10 PROCEDURE — 63600175 PHARM REV CODE 636 W HCPCS: Performed by: UROLOGY

## 2017-10-10 PROCEDURE — 71000016 HC POSTOP RECOV ADDL HR: Performed by: UROLOGY

## 2017-10-10 PROCEDURE — 25000003 PHARM REV CODE 250: Performed by: ANESTHESIOLOGY

## 2017-10-10 PROCEDURE — 36000705 HC OR TIME LEV I EA ADD 15 MIN: Performed by: UROLOGY

## 2017-10-10 PROCEDURE — 63600175 PHARM REV CODE 636 W HCPCS: Performed by: NURSE ANESTHETIST, CERTIFIED REGISTERED

## 2017-10-10 PROCEDURE — 71000033 HC RECOVERY, INTIAL HOUR: Performed by: UROLOGY

## 2017-10-10 PROCEDURE — 25000003 PHARM REV CODE 250: Performed by: UROLOGY

## 2017-10-10 PROCEDURE — D9220A PRA ANESTHESIA: Mod: CRNA,,, | Performed by: NURSE ANESTHETIST, CERTIFIED REGISTERED

## 2017-10-10 PROCEDURE — 37000009 HC ANESTHESIA EA ADD 15 MINS: Performed by: UROLOGY

## 2017-10-10 PROCEDURE — 36000704 HC OR TIME LEV I 1ST 15 MIN: Performed by: UROLOGY

## 2017-10-10 PROCEDURE — 25000003 PHARM REV CODE 250: Performed by: NURSE ANESTHETIST, CERTIFIED REGISTERED

## 2017-10-10 RX ORDER — GLYCOPYRROLATE 0.2 MG/ML
INJECTION INTRAMUSCULAR; INTRAVENOUS
Status: DISCONTINUED | OUTPATIENT
Start: 2017-10-10 | End: 2017-10-10

## 2017-10-10 RX ORDER — METOCLOPRAMIDE HYDROCHLORIDE 5 MG/ML
10 INJECTION INTRAMUSCULAR; INTRAVENOUS EVERY 10 MIN PRN
Status: DISCONTINUED | OUTPATIENT
Start: 2017-10-10 | End: 2017-10-10 | Stop reason: HOSPADM

## 2017-10-10 RX ORDER — LIDOCAINE HYDROCHLORIDE 10 MG/ML
1 INJECTION, SOLUTION EPIDURAL; INFILTRATION; INTRACAUDAL; PERINEURAL ONCE
Status: DISCONTINUED | OUTPATIENT
Start: 2017-10-10 | End: 2017-10-10 | Stop reason: HOSPADM

## 2017-10-10 RX ORDER — PROPOFOL 10 MG/ML
VIAL (ML) INTRAVENOUS
Status: DISCONTINUED | OUTPATIENT
Start: 2017-10-10 | End: 2017-10-10

## 2017-10-10 RX ORDER — MIDAZOLAM HYDROCHLORIDE 1 MG/ML
INJECTION, SOLUTION INTRAMUSCULAR; INTRAVENOUS
Status: DISCONTINUED | OUTPATIENT
Start: 2017-10-10 | End: 2017-10-10

## 2017-10-10 RX ORDER — CEPHALEXIN 500 MG/1
500 CAPSULE ORAL EVERY 12 HOURS
Qty: 4 CAPSULE | Refills: 0 | Status: SHIPPED | OUTPATIENT
Start: 2017-10-10

## 2017-10-10 RX ORDER — SODIUM CHLORIDE 0.9 % (FLUSH) 0.9 %
3 SYRINGE (ML) INJECTION
Status: DISCONTINUED | OUTPATIENT
Start: 2017-10-10 | End: 2017-10-10 | Stop reason: HOSPADM

## 2017-10-10 RX ORDER — ONDANSETRON 2 MG/ML
4 INJECTION INTRAMUSCULAR; INTRAVENOUS EVERY 12 HOURS PRN
Status: DISCONTINUED | OUTPATIENT
Start: 2017-10-10 | End: 2017-10-10 | Stop reason: HOSPADM

## 2017-10-10 RX ORDER — OXYCODONE AND ACETAMINOPHEN 5; 325 MG/1; MG/1
1 TABLET ORAL
Status: DISCONTINUED | OUTPATIENT
Start: 2017-10-10 | End: 2017-10-10 | Stop reason: HOSPADM

## 2017-10-10 RX ORDER — MEPERIDINE HYDROCHLORIDE 50 MG/ML
12.5 INJECTION INTRAMUSCULAR; INTRAVENOUS; SUBCUTANEOUS ONCE AS NEEDED
Status: DISCONTINUED | OUTPATIENT
Start: 2017-10-10 | End: 2017-10-10 | Stop reason: HOSPADM

## 2017-10-10 RX ORDER — ACETAMINOPHEN 325 MG/1
650 TABLET ORAL EVERY 4 HOURS PRN
Status: DISCONTINUED | OUTPATIENT
Start: 2017-10-10 | End: 2017-10-10 | Stop reason: HOSPADM

## 2017-10-10 RX ORDER — METOCLOPRAMIDE HYDROCHLORIDE 5 MG/ML
INJECTION INTRAMUSCULAR; INTRAVENOUS
Status: DISCONTINUED | OUTPATIENT
Start: 2017-10-10 | End: 2017-10-10

## 2017-10-10 RX ORDER — CIPROFLOXACIN 2 MG/ML
400 INJECTION, SOLUTION INTRAVENOUS
Status: COMPLETED | OUTPATIENT
Start: 2017-10-10 | End: 2017-10-10

## 2017-10-10 RX ORDER — ONDANSETRON 2 MG/ML
INJECTION INTRAMUSCULAR; INTRAVENOUS
Status: DISCONTINUED | OUTPATIENT
Start: 2017-10-10 | End: 2017-10-10

## 2017-10-10 RX ORDER — HYDROCODONE BITARTRATE AND ACETAMINOPHEN 5; 325 MG/1; MG/1
1 TABLET ORAL EVERY 6 HOURS PRN
Qty: 10 TABLET | Refills: 0 | Status: SHIPPED | OUTPATIENT
Start: 2017-10-10

## 2017-10-10 RX ORDER — LIDOCAINE HCL/PF 100 MG/5ML
SYRINGE (ML) INTRAVENOUS
Status: DISCONTINUED | OUTPATIENT
Start: 2017-10-10 | End: 2017-10-10

## 2017-10-10 RX ORDER — FENTANYL CITRATE 50 UG/ML
INJECTION, SOLUTION INTRAMUSCULAR; INTRAVENOUS
Status: DISCONTINUED | OUTPATIENT
Start: 2017-10-10 | End: 2017-10-10

## 2017-10-10 RX ORDER — HYDROMORPHONE HYDROCHLORIDE 2 MG/ML
0.2 INJECTION, SOLUTION INTRAMUSCULAR; INTRAVENOUS; SUBCUTANEOUS EVERY 5 MIN PRN
Status: DISCONTINUED | OUTPATIENT
Start: 2017-10-10 | End: 2017-10-10 | Stop reason: HOSPADM

## 2017-10-10 RX ORDER — HYDROCODONE BITARTRATE AND ACETAMINOPHEN 5; 325 MG/1; MG/1
1 TABLET ORAL EVERY 4 HOURS PRN
Status: DISCONTINUED | OUTPATIENT
Start: 2017-10-10 | End: 2017-10-10 | Stop reason: HOSPADM

## 2017-10-10 RX ORDER — SODIUM CHLORIDE, SODIUM LACTATE, POTASSIUM CHLORIDE, CALCIUM CHLORIDE 600; 310; 30; 20 MG/100ML; MG/100ML; MG/100ML; MG/100ML
INJECTION, SOLUTION INTRAVENOUS CONTINUOUS
Status: DISCONTINUED | OUTPATIENT
Start: 2017-10-10 | End: 2017-10-10 | Stop reason: HOSPADM

## 2017-10-10 RX ADMIN — FENTANYL CITRATE 100 MCG: 50 INJECTION INTRAMUSCULAR; INTRAVENOUS at 02:10

## 2017-10-10 RX ADMIN — LIDOCAINE HYDROCHLORIDE 100 MG: 20 INJECTION, SOLUTION INTRAVENOUS at 02:10

## 2017-10-10 RX ADMIN — MIDAZOLAM HYDROCHLORIDE 2 MG: 1 INJECTION, SOLUTION INTRAMUSCULAR; INTRAVENOUS at 02:10

## 2017-10-10 RX ADMIN — METOCLOPRAMIDE 10 MG: 5 INJECTION, SOLUTION INTRAMUSCULAR; INTRAVENOUS at 02:10

## 2017-10-10 RX ADMIN — HYDROCODONE BITARTRATE AND ACETAMINOPHEN 1 TABLET: 5; 325 TABLET ORAL at 04:10

## 2017-10-10 RX ADMIN — GLYCOPYRROLATE 0.1 MG: 0.2 INJECTION, SOLUTION INTRAMUSCULAR; INTRAVENOUS at 03:10

## 2017-10-10 RX ADMIN — PROPOFOL 200 MG: 10 INJECTION, EMULSION INTRAVENOUS at 02:10

## 2017-10-10 RX ADMIN — GLYCOPYRROLATE 0.1 MG: 0.2 INJECTION, SOLUTION INTRAMUSCULAR; INTRAVENOUS at 02:10

## 2017-10-10 RX ADMIN — CIPROFLOXACIN 400 MG: 2 INJECTION, SOLUTION INTRAVENOUS at 02:10

## 2017-10-10 RX ADMIN — ONDANSETRON 4 MG: 2 INJECTION, SOLUTION INTRAMUSCULAR; INTRAVENOUS at 02:10

## 2017-10-10 RX ADMIN — SODIUM CHLORIDE, SODIUM LACTATE, POTASSIUM CHLORIDE, AND CALCIUM CHLORIDE: 600; 310; 30; 20 INJECTION, SOLUTION INTRAVENOUS at 02:10

## 2017-10-10 NOTE — OP NOTE
DATE OF PROCEDURE:  10/10/2017 3:49 PM    SURGEON: Lupe Oseguera M.D.    PREOPERATIVE DIAGNOSIS: Left ureteral calculus.    POSTOPERATIVE DIAGNOSIS: Left ureteral calculus.  .  PROCEDURE PERFORMED: Left extracorporeal shockwave lithotripsy (ESWL)    INDICATIONS FOR PROCEDURE: Desean Santos is a 26 y.o. male who is a new patient who was referred by Dr Bay for evaluation of kidney stone.       He was seen in ER on 9/23/17 with L flank pain and n/v. CT scan showed 6mm proximal L ureteral calculus with mild proximal hydronephrosis. Denies fevers. He has not had previous stones. He is taking pain medications and Flomax. He has not noted passing a stone. Pain is present but moderately controled with ibuprofen (declines narcotics). Stone is visible on .      KUB done 10/2/17 shows stone in prox/mid ureter, just slightly descended from CT scan. He was offered URS today but declined.       PROCEDURE IN DETAIL: The patient was brought to the Operating Room and placed under general LMA anesthesia. Full timeout procedures were performed   identifying the correct patient, procedure and laterality. Appropriate IV antibiotics with ciprofloxacin were given prior to commencement of surgery. The patient was placed in a supine position on the ESWL table.    The stone was able to be visualized with fluoroscopy showing a 6mm stone in the left ureter. The patient was placed in the appropriate position on the ESWL table and water was placed below for transfer of the energy from the ESWL machine. The stone was visualized and able to be easily seen and was located in X, Y and Z plane for adequate targeting. The ESWL was begun with low energy and increased appropriately. The rate of treatment was 120 shocks/min to allow appropriate fragmentation.     Fluoroscopy was done intermittently throughout the entirety of the procedure, monitoring the stone fragmentation, as well as to ensure that the stone continued to align  appropriately in the X, Y and Z plane. At the conclusion of 4000 shocks, the   stone was less visible on fluoroscopy.        The patient was awakened from general anesthesia and transferred to the PACU in   stable condition.    COMPLICATIONS: None.    FINDINGS: Radiopaque 6mm calculus in the left ureter with fair response (as expected in ureter) after 4000 shocks.    ESTIMATED BLOOD LOSS: 0 mL.    SPECIMENS: None.    DRAINS: None.    PATIENT DISPOSITION: The patient is stable and deemed appropriate for discharge home. Follow up will be in approximately 3 weeks with KUB prior.      Discharge Note    SUMMARY     Admit Date:  10/10/2017 3:51 PM    Discharge Date and Time:  10/10/2017 3:51 PM    Hospital Course (synopsis of major diagnoses, care, treatment, and services provided during the course of the hospital stay): Uncomplicated ESWL procedure     Final Diagnosis: Post-Op Diagnosis Codes:     * Nephrolithiasis [N20.0]    Disposition: Home or Self Care    Follow Up/Patient Instructions:   Drink plenty of fluids.      Medications:  Reconciled Home Medications:   Current Discharge Medication List      START taking these medications    Details   cephALEXin (KEFLEX) 500 MG capsule Take 1 capsule (500 mg total) by mouth every 12 (twelve) hours.  Qty: 4 capsule, Refills: 0      hydrocodone-acetaminophen 5-325mg (NORCO) 5-325 mg per tablet Take 1 tablet by mouth every 6 (six) hours as needed for Pain.  Qty: 10 tablet, Refills: 0         CONTINUE these medications which have NOT CHANGED    Details   ibuprofen (ADVIL,MOTRIN) 800 MG tablet Take 1 tablet (800 mg total) by mouth 3 (three) times daily. With food  Qty: 30 tablet, Refills: 0      TAMSULOSIN HCL (TAMSULOSIN ORAL) Take by mouth.             Discharge Procedure Orders  Diet general     Activity as tolerated     Call MD for:  temperature >100.4     Call MD for:  persistent nausea and vomiting     Call MD for:  severe uncontrolled pain     Call MD for:  difficulty  breathing, headache or visual disturbances     No dressing needed       Follow-up Information     Follow up with Lupe Oseguera MD In 3 weeks.    Specialty:  Urology    Why:  For post-op follow up with KUB prior    Contact information:    [address] [place] [id number] [phone]

## 2017-10-10 NOTE — TRANSFER OF CARE
"Anesthesia Transfer of Care Note    Patient: Desean Santos    Procedure(s) Performed: Procedure(s) (LRB):  LITHOTRIPSY-EXTRACORPOREAL SHOCK WAVE - Please do KUB in holding (Left)    Patient location: PACU    Anesthesia Type: general    Transport from OR: Transported from OR on room air with adequate spontaneous ventilation    Post assessment: no apparent anesthetic complications and tolerated procedure well    Post vital signs: stable    Level of consciousness: awake, alert and oriented    Complications: none    Transfer of care protocol was followed      Last vitals:   Visit Vitals  /62   Pulse 88   Temp 36.6 °C (97.9 °F) (Oral)   Resp 14   Ht 5' 8" (1.727 m)   Wt 78.4 kg (172 lb 13.5 oz)   SpO2 100%   BMI 26.28 kg/m²     "

## 2017-10-10 NOTE — PLAN OF CARE
Problem: Lithotripsy (ECSL) (Adult)  Goal: Signs and Symptoms of Listed Potential Problems Will be Absent, Minimized or Managed (Lithotripsy)  Signs and symptoms of listed potential problems will be absent, minimized or managed by discharge/transition of care (reference Lithotripsy (ECSL) (Adult) CPG).   Outcome: Ongoing (interventions implemented as appropriate)   10/10/17 1620   Lithotripsy (ECSL)   Problems Assessed (Lithotripsy (ECSL)) all   Problems Present (Lithotripsy (ECSL)) pain     Recovery care complete. No acute events during recovery phase. AA/O. Atilio score 10/10. No N/V. Afebrile. Adequate spo2s maintained via RA; no SOB noted. All other VSS. NSR per monitor. Acceptable level of pain maintained (5/10) via PRN pain medication administered per MD order. IVF infusing. No swelling noted to L flank. Report given to Denise LINDSEY RN in [facility]. Pt transported to [facility] via stretcher. Family notified.    Goal: Anesthesia/Sedation Recovery  Outcome: Ongoing (interventions implemented as appropriate)   10/10/17 1620   Goal/Outcome Evaluation   Anesthesia/Sedation Recovery criteria met for transfer

## 2017-10-10 NOTE — TELEPHONE ENCOUNTER
----- Message from Lupe Oseguera MD sent at 10/10/2017  3:48 PM CDT -----  Please schedule pt for post-op follow up in 3 weeks with ERICA prior (at [facility], [place])

## 2017-10-10 NOTE — DISCHARGE INSTRUCTIONS
May have some blood with urination. Drink plenty of fluids to help flush stone fragments.     Pain Med ication last given at 16:44    DIET: You may resume your home diet. If nausea is present, increase your diet gradually with fluids and bland foods    ACTIVITY LEVEL: You have received sedation or an anesthetic, you may feel sleepy for several hours. Rest until you are more awake. Gradually resume your normal activities    Medications: Pain medication should be taken only if needed and as directed. If antibiotics are prescribed, the medication should be taken until completed. You will be given an updated list of you medications.    No driving, alcoholic beverages or signing legal documents for next 24 hours or while taking pain medication.       CALL THE DOCTOR:    For any obvious bleeding (some dried blood over the incision is normal).      Redness, swelling, foul smell around incision or fever over 101.   Shortness of breath, Coughing up Bloody sputum, Pains or Swelling in your Calves .   Persistent pain or nausea not relieved by medication.    If any unusual problems or difficulties occur contact your doctor. If you cannot contact your doctor but feel your signs and symptoms warrant a physicians attention return to the emergency room.      Fall Prevention  Millions of people fall every year and injure themselves. You may have had anesthesia or sedation which may increase your risk of falling. You may have health issues that put you at an increased risk of falling.     Here are ways to reduce your risk of falling.  ·   · Make your home safe by keeping walkways clear of objects you may trip over.  · Use non-slip pads under rugs. Do not use area rugs or small throw rugs.  · Use non-slip mats in bathtubs and showers.  · Install handrails and lights on staircases.  · Do not walk in poorly lit areas.  · Do not stand on chairs or wobbly ladders.  · Use caution when reaching overhead or looking upward. This position  can cause a loss of balance.  · Be sure your shoes fit properly, have non-slip bottoms and are in good condition.   · Wear shoes both inside and out. Avoid going barefoot or wearing slippers.  · Be cautious when going up and down stairs, curbs, and when walking on uneven sidewalks.  · If your balance is poor, consider using a cane or walker.  · If your fall was related to alcohol use, stop or limit alcohol intake.   · If your fall was related to use of sleeping medicines, talk to your doctor about this. You may need to reduce your dosage at bedtime if you awaken during the night to go to the bathroom.    · To reduce the need for nighttime bathroom trips:  ¨ Avoid drinking fluids for several hours before going to bed  ¨ Empty your bladder before going to bed  ¨ Men can keep a urinal at the bedside  · Stay as active as you can. Balance, flexibility, strength, and endurance all come from exercise. They all play a role in preventing falls. Ask your healthcare provider which types of activity are right for you.  · Get your vision checked on a regular basis.  · If you have pets, know where they are before you stand up or walk so you don't trip over them.  · Use night lights.

## 2017-10-10 NOTE — NURSING TRANSFER
Nursing Transfer Note      10/10/2017     Transfer To: [facility]; PER handoff given to Denise LINDSEY RN    Transfer From: PACU    Transfer via stretcher    Transfer with IVF    Transported by BERTRAND Hartley RN    Medicines sent: Script in Chart    Chart send with patient: Yes    Notified: yuner Curtkareen Santos

## 2017-10-11 NOTE — ANESTHESIA PREPROCEDURE EVALUATION
10/11/2017  Desean Santos is a 26 y.o., male.    Anesthesia Evaluation     I have reviewed the Nursing Notes.      Review of Systems  Anesthesia Hx:  No problems with previous Anesthesia   Social:  Non-Smoker    Cardiovascular:  Cardiovascular Normal Exercise tolerance: good     Pulmonary:   Asthma asymptomatic and mild Sleep Apnea    Renal/:   renal calculi    Hepatic/GI:  Hepatic/GI Normal    Neurological:  Neurology Normal    Endocrine:  Endocrine Normal        Physical Exam  General:  Well nourished    Airway/Jaw/Neck:  AIRWAY FINDINGS: Normal           Mental Status:  Mental Status Findings: Normal        Anesthesia Plan  Type of Anesthesia, risks & benefits discussed:  Anesthesia Type:  general  Patient's Preference:   Intra-op Monitoring Plan: standard ASA monitors  Intra-op Monitoring Plan Comments:   Post Op Pain Control Plan:   Post Op Pain Control Plan Comments:   Induction:   IV  Beta Blocker:  Patient is not currently on a Beta-Blocker (No further documentation required).       Informed Consent: Patient understands risks and agrees with Anesthesia plan.  Questions answered. Anesthesia consent signed with patient.  ASA Score: 2     Day of Surgery Review of History & Physical:    H&P update referred to the surgeon.         Ready For Surgery From Anesthesia Perspective.

## 2017-10-11 NOTE — ANESTHESIA POSTPROCEDURE EVALUATION
"Anesthesia Post Evaluation    Patient: Desean Santos    Procedure(s) Performed: Procedure(s) (LRB):  LITHOTRIPSY-EXTRACORPOREAL SHOCK WAVE - Please do KUB in holding (Left)    Final Anesthesia Type: general  Patient location during evaluation: PACU  Patient participation: Yes- Able to Participate  Level of consciousness: awake and alert  Post-procedure vital signs: reviewed and stable  Pain management: adequate  Airway patency: patent  PONV status at discharge: No PONV  Anesthetic complications: no      Cardiovascular status: blood pressure returned to baseline and hemodynamically stable  Respiratory status: unassisted  Hydration status: euvolemic  Follow-up not needed.        Visit Vitals  /76 (BP Location: Left arm, Patient Position: Sitting)   Pulse 85   Temp 36.6 °C (97.9 °F) (Oral)   Resp 20   Ht 5' 8" (1.727 m)   Wt 78.4 kg (172 lb 13.5 oz)   SpO2 100%   BMI 26.28 kg/m²       Pain/Atilio Score: Pain Assessment Performed: Yes (10/10/2017  5:22 PM)  Presence of Pain: denies (10/10/2017  5:22 PM)  Pain Rating Prior to Med Admin: 4 (10/10/2017  4:44 PM)  Atilio Score: 10 (10/10/2017  4:30 PM)  Modified Atilio Score: 20 (10/10/2017  4:30 PM)      "

## 2017-11-01 ENCOUNTER — TELEPHONE (OUTPATIENT)
Dept: UROLOGY | Facility: CLINIC | Age: 27
End: 2017-11-01

## 2017-11-01 DIAGNOSIS — N20.0 KIDNEY STONE: Primary | ICD-10-CM

## 2017-11-01 NOTE — TELEPHONE ENCOUNTER
----- Message from Darcie Brito sent at 11/1/2017  1:45 PM CDT -----  Contact: pt   x 1st Request  _ 2nd Request  _ 3rd Request    Who: pt     Why: pt is requesting to speak to nurse in regards to his kidney stones. Please call and advise.     What Number to Call Back: [phone]     When to Expect a call back: (Before the end of the day)  -- if call after 3:00 call back will be tomorrow.

## (undated) DEVICE — SUPPORT ULNA NERVE PROTECTOR